# Patient Record
(demographics unavailable — no encounter records)

---

## 2024-11-08 NOTE — HISTORY OF PRESENT ILLNESS
[FreeTextEntry1] : A 43-year-old patient presents with a scar on the left temple. The patient sustained the injury after falling in the shower and hitting their head on the toilet, which also resulted in a broken nose. The patient was treated at an emergency room in [place], where sutures were placed.  Endocrine scar is hyperpigmented and slightly purpuric. the patient is dissatisfied with the appearance of the scar.  Patient  presents to discuss options for scar treatment denies sig PMH/PSH No history of keloids or poor scarring

## 2024-11-08 NOTE — HISTORY OF PRESENT ILLNESS
[FreeTextEntry1] : A 43-year-old patient presents with a scar on the left temple. The patient sustained the injury after falling in the shower and hitting their head on the toilet, which also resulted in a broken nose. The patient was treated at an emergency room in [place], where sutures were placed.  Endocrine scar is hyperpigmented and slightly purpuric. the patient is dissatisfied with the appearance of the scar.  Patient  presents to discuss options for scar treatment denies sig PMH/PSH No history of keloids or poor scarring